# Patient Record
Sex: FEMALE | Race: WHITE | Employment: UNEMPLOYED | RURAL
[De-identification: names, ages, dates, MRNs, and addresses within clinical notes are randomized per-mention and may not be internally consistent; named-entity substitution may affect disease eponyms.]

---

## 2022-12-11 ENCOUNTER — HOSPITAL ENCOUNTER (EMERGENCY)
Facility: HOSPITAL | Age: 62
Discharge: HOME | End: 2022-12-11
Attending: EMERGENCY MEDICINE
Payer: MEDICAID

## 2022-12-11 ENCOUNTER — APPOINTMENT (OUTPATIENT)
Dept: GENERAL RADIOLOGY | Facility: HOSPITAL | Age: 62
End: 2022-12-11
Payer: MEDICAID

## 2022-12-11 VITALS
HEIGHT: 63 IN | OXYGEN SATURATION: 94 % | RESPIRATION RATE: 28 BRPM | TEMPERATURE: 99 F | BODY MASS INDEX: 25.16 KG/M2 | DIASTOLIC BLOOD PRESSURE: 107 MMHG | WEIGHT: 142 LBS | SYSTOLIC BLOOD PRESSURE: 144 MMHG | HEART RATE: 132 BPM

## 2022-12-11 DIAGNOSIS — J10.1 INFLUENZA A: Primary | ICD-10-CM

## 2022-12-11 LAB
A/G RATIO: 1.3 (ref 0.8–2)
ALBUMIN SERPL-MCNC: 4 G/DL (ref 3.4–4.8)
ALP BLD-CCNC: 104 U/L (ref 25–100)
ALT SERPL-CCNC: 14 U/L (ref 4–36)
ANION GAP SERPL CALCULATED.3IONS-SCNC: 11 MMOL/L (ref 3–16)
AST SERPL-CCNC: 24 U/L (ref 8–33)
BASOPHILS ABSOLUTE: 0 K/UL (ref 0–0.1)
BASOPHILS RELATIVE PERCENT: 0.3 %
BILIRUB SERPL-MCNC: 0.4 MG/DL (ref 0.3–1.2)
BUN BLDV-MCNC: 7 MG/DL (ref 6–20)
CALCIUM SERPL-MCNC: 9.3 MG/DL (ref 8.5–10.5)
CHLORIDE BLD-SCNC: 93 MMOL/L (ref 98–107)
CO2: 29 MMOL/L (ref 20–30)
CREAT SERPL-MCNC: 0.7 MG/DL (ref 0.4–1.2)
EOSINOPHILS ABSOLUTE: 0.1 K/UL (ref 0–0.4)
EOSINOPHILS RELATIVE PERCENT: 2 %
GFR SERPL CREATININE-BSD FRML MDRD: >60 ML/MIN/{1.73_M2}
GLOBULIN: 3 G/DL
GLUCOSE BLD-MCNC: 112 MG/DL (ref 74–106)
HCT VFR BLD CALC: 44.9 % (ref 37–47)
HEMOGLOBIN: 15.1 G/DL (ref 11.5–16.5)
IMMATURE GRANULOCYTES #: 0 K/UL
IMMATURE GRANULOCYTES %: 0.4 % (ref 0–5)
LACTIC ACID: 1.8 MMOL/L (ref 0.4–2)
LYMPHOCYTES ABSOLUTE: 1.6 K/UL (ref 1.5–4)
LYMPHOCYTES RELATIVE PERCENT: 23 %
MAGNESIUM: 1.7 MG/DL (ref 1.7–2.4)
MCH RBC QN AUTO: 30.2 PG (ref 27–32)
MCHC RBC AUTO-ENTMCNC: 33.6 G/DL (ref 31–35)
MCV RBC AUTO: 89.8 FL (ref 80–100)
MONOCYTES ABSOLUTE: 1.2 K/UL (ref 0.2–0.8)
MONOCYTES RELATIVE PERCENT: 17.1 %
NEUTROPHILS ABSOLUTE: 4 K/UL (ref 2–7.5)
NEUTROPHILS RELATIVE PERCENT: 57.2 %
PDW BLD-RTO: 14.2 % (ref 11–16)
PLATELET # BLD: 244 K/UL (ref 150–400)
PMV BLD AUTO: 9.9 FL (ref 6–10)
POTASSIUM SERPL-SCNC: 3.3 MMOL/L (ref 3.4–5.1)
RAPID INFLUENZA  B AGN: NEGATIVE
RAPID INFLUENZA A AGN: POSITIVE
RBC # BLD: 5 M/UL (ref 3.8–5.8)
SARS-COV-2, NAAT: NOT DETECTED
SODIUM BLD-SCNC: 133 MMOL/L (ref 136–145)
TOTAL PROTEIN: 7 G/DL (ref 6.4–8.3)
WBC # BLD: 6.9 K/UL (ref 4–11)

## 2022-12-11 PROCEDURE — 2580000003 HC RX 258: Performed by: EMERGENCY MEDICINE

## 2022-12-11 PROCEDURE — 87804 INFLUENZA ASSAY W/OPTIC: CPT

## 2022-12-11 PROCEDURE — 93005 ELECTROCARDIOGRAM TRACING: CPT

## 2022-12-11 PROCEDURE — 87635 SARS-COV-2 COVID-19 AMP PRB: CPT

## 2022-12-11 PROCEDURE — 80053 COMPREHEN METABOLIC PANEL: CPT

## 2022-12-11 PROCEDURE — 99285 EMERGENCY DEPT VISIT HI MDM: CPT

## 2022-12-11 PROCEDURE — 6370000000 HC RX 637 (ALT 250 FOR IP): Performed by: EMERGENCY MEDICINE

## 2022-12-11 PROCEDURE — 83735 ASSAY OF MAGNESIUM: CPT

## 2022-12-11 PROCEDURE — 83605 ASSAY OF LACTIC ACID: CPT

## 2022-12-11 PROCEDURE — 85025 COMPLETE CBC W/AUTO DIFF WBC: CPT

## 2022-12-11 PROCEDURE — 71045 X-RAY EXAM CHEST 1 VIEW: CPT

## 2022-12-11 PROCEDURE — 94640 AIRWAY INHALATION TREATMENT: CPT

## 2022-12-11 PROCEDURE — 36415 COLL VENOUS BLD VENIPUNCTURE: CPT

## 2022-12-11 RX ORDER — LISINOPRIL 40 MG/1
40 TABLET ORAL DAILY
COMMUNITY

## 2022-12-11 RX ORDER — PAROXETINE 10 MG/1
10 TABLET, FILM COATED ORAL EVERY MORNING
COMMUNITY

## 2022-12-11 RX ORDER — ASPIRIN 81 MG/1
81 TABLET ORAL DAILY
COMMUNITY

## 2022-12-11 RX ORDER — IPRATROPIUM BROMIDE AND ALBUTEROL SULFATE 2.5; .5 MG/3ML; MG/3ML
1 SOLUTION RESPIRATORY (INHALATION)
Status: COMPLETED | OUTPATIENT
Start: 2022-12-11 | End: 2022-12-11

## 2022-12-11 RX ORDER — GABAPENTIN 300 MG/1
300 CAPSULE ORAL 4 TIMES DAILY
COMMUNITY

## 2022-12-11 RX ORDER — FLUTICASONE PROPIONATE AND SALMETEROL 100; 50 UG/1; UG/1
1 POWDER RESPIRATORY (INHALATION) EVERY 12 HOURS
COMMUNITY

## 2022-12-11 RX ORDER — CETIRIZINE HYDROCHLORIDE 10 MG/1
10 TABLET ORAL DAILY
COMMUNITY

## 2022-12-11 RX ORDER — FLUTICASONE PROPIONATE 50 MCG
1 SPRAY, SUSPENSION (ML) NASAL DAILY
COMMUNITY

## 2022-12-11 RX ORDER — POTASSIUM CHLORIDE 20 MEQ/1
40 TABLET, EXTENDED RELEASE ORAL ONCE
Status: COMPLETED | OUTPATIENT
Start: 2022-12-11 | End: 2022-12-11

## 2022-12-11 RX ORDER — SODIUM CHLORIDE, SODIUM LACTATE, POTASSIUM CHLORIDE, AND CALCIUM CHLORIDE .6; .31; .03; .02 G/100ML; G/100ML; G/100ML; G/100ML
1000 INJECTION, SOLUTION INTRAVENOUS ONCE
Status: COMPLETED | OUTPATIENT
Start: 2022-12-11 | End: 2022-12-11

## 2022-12-11 RX ORDER — OSELTAMIVIR PHOSPHATE 75 MG/1
75 CAPSULE ORAL ONCE
Status: COMPLETED | OUTPATIENT
Start: 2022-12-11 | End: 2022-12-11

## 2022-12-11 RX ORDER — ALBUTEROL SULFATE 90 UG/1
2 AEROSOL, METERED RESPIRATORY (INHALATION) EVERY 6 HOURS PRN
COMMUNITY
End: 2022-12-11 | Stop reason: ALTCHOICE

## 2022-12-11 RX ORDER — IPRATROPIUM BROMIDE AND ALBUTEROL SULFATE 2.5; .5 MG/3ML; MG/3ML
1 SOLUTION RESPIRATORY (INHALATION) ONCE
Status: COMPLETED | OUTPATIENT
Start: 2022-12-11 | End: 2022-12-11

## 2022-12-11 RX ORDER — CHLORTHALIDONE 25 MG/1
25 TABLET ORAL DAILY
COMMUNITY

## 2022-12-11 RX ORDER — LANSOPRAZOLE 30 MG/1
30 TABLET, ORALLY DISINTEGRATING, DELAYED RELEASE ORAL DAILY
COMMUNITY

## 2022-12-11 RX ORDER — OSELTAMIVIR PHOSPHATE 75 MG/1
75 CAPSULE ORAL 2 TIMES DAILY
Qty: 10 CAPSULE | Refills: 0 | Status: SHIPPED | OUTPATIENT
Start: 2022-12-11 | End: 2022-12-16

## 2022-12-11 RX ORDER — ARIPIPRAZOLE 2 MG/1
2 TABLET ORAL DAILY
COMMUNITY

## 2022-12-11 RX ORDER — METOPROLOL SUCCINATE 50 MG/1
50 TABLET, EXTENDED RELEASE ORAL DAILY
COMMUNITY

## 2022-12-11 RX ORDER — LEVOTHYROXINE SODIUM 0.05 MG/1
50 TABLET ORAL DAILY
COMMUNITY

## 2022-12-11 RX ADMIN — IPRATROPIUM BROMIDE AND ALBUTEROL SULFATE 1 AMPULE: 2.5; .5 SOLUTION RESPIRATORY (INHALATION) at 17:27

## 2022-12-11 RX ADMIN — SODIUM CHLORIDE, POTASSIUM CHLORIDE, SODIUM LACTATE AND CALCIUM CHLORIDE 1000 ML: 600; 310; 30; 20 INJECTION, SOLUTION INTRAVENOUS at 17:10

## 2022-12-11 RX ADMIN — POTASSIUM CHLORIDE 40 MEQ: 1500 TABLET, EXTENDED RELEASE ORAL at 17:53

## 2022-12-11 RX ADMIN — OSELTAMIVIR PHOSPHATE 75 MG: 75 CAPSULE ORAL at 17:53

## 2022-12-11 RX ADMIN — IPRATROPIUM BROMIDE AND ALBUTEROL SULFATE 1 AMPULE: 2.5; .5 SOLUTION RESPIRATORY (INHALATION) at 18:39

## 2022-12-11 ASSESSMENT — PAIN DESCRIPTION - DESCRIPTORS
DESCRIPTORS: ACHING
DESCRIPTORS: ACHING

## 2022-12-11 ASSESSMENT — PAIN DESCRIPTION - LOCATION
LOCATION: LEG
LOCATION: GENERALIZED
LOCATION: LEG

## 2022-12-11 ASSESSMENT — PAIN DESCRIPTION - PAIN TYPE
TYPE: CHRONIC PAIN
TYPE: ACUTE PAIN;CHRONIC PAIN
TYPE: CHRONIC PAIN

## 2022-12-11 ASSESSMENT — PAIN SCALES - GENERAL
PAINLEVEL_OUTOF10: 9
PAINLEVEL_OUTOF10: 4
PAINLEVEL_OUTOF10: 4

## 2022-12-11 ASSESSMENT — PAIN DESCRIPTION - ORIENTATION
ORIENTATION: LEFT;RIGHT
ORIENTATION: RIGHT;LEFT

## 2022-12-11 ASSESSMENT — PAIN - FUNCTIONAL ASSESSMENT
PAIN_FUNCTIONAL_ASSESSMENT: 0-10
PAIN_FUNCTIONAL_ASSESSMENT: 0-10

## 2022-12-11 ASSESSMENT — LIFESTYLE VARIABLES: HOW OFTEN DO YOU HAVE A DRINK CONTAINING ALCOHOL: NEVER

## 2022-12-11 NOTE — ED NOTES
Per RN Reynaldo Nunez, went to pull the IV and repeat vitals for this patient. Patient states that she, \" can not breathe or catch her breath\". States that her chest still burns as well. Patient at this time has a Heart Rate of  149 and RR of 40. Nurse Reynaldo Nunez was in the room at this time and suggested that I wait to pull the IV so she can report these vitals to the DR. Jermaine Clay  12/11/22 1375

## 2022-12-11 NOTE — FLOWSHEET NOTE
12/11/22 1830   Vital Signs   Heart Rate (!) 128   Resp 26   BP (!) 157/102   MAP (Calculated) 120   MAP (mmHg) 117   Oxygen Therapy   SpO2 94 %   Pulse via Oximetry 127 beats per minute    Pt is up for discharge,  vs elevated and pt complains of shortness of breath, spoke with MD r/t tachycardia, hypertension and complaints of shortness of breath.

## 2022-12-11 NOTE — ED NOTES
I spoke with the patient about her wish to go home or stay in the hospital, since she stated she was still having trouble breathing. She stated she wanted to go home. Her daughter is going to help her find her nebulizer machine and she will take her breathing treatments at home. I gave her a second Duoneb. She was still wheezing throughout and had a dry, strong cough at this time. She stated she felt a little better and thought she could manage this at home. She agreed to come back if she didn't get any better. Kojo Bear RN and Dr. Zahida Brady aware.  DW, RRT

## 2022-12-11 NOTE — ED NOTES
I did speak with dr Shyanne Callahan about pt discharge, bp elevated, hr elevated. Per md ok to discharge to home. She advised that she has spoken with pt, who declined admission. She advised pt to return if she is feeling worse or has any new complaints or concerns.      Reg Allen RN  12/11/22 [address] Juan Cortez RN  12/11/22 8821

## 2022-12-11 NOTE — ED PROVIDER NOTES
Sena Callahan [address] ENCOUNTER      Pt Name: Luiza Perez  MRN: [id number]  YOB: 1960  Date of evaluation: 12/11/2022  Provider: Juancho Ham MD    CHIEF COMPLAINT       Chief Complaint   Patient presents with    Shortness of Breath    Nasal Congestion         HISTORY OF PRESENT ILLNESS  (Location/Symptom, Timing/Onset, Context/Setting, Quality, Duration, Modifying Factors, Severity.)   Luiza Perez is a 63 y.o. female who presents to the emergency department complaining of shortness of breath chest congestion hoarseness for the last 3 days she did take Tylenol about 2-1/2 hours ago she has not had a fever she has had head congestion but no sore throat she has had a dry cough no chest pain or abdominal pain no nausea vomiting diarrhea she has not had a COVID-19 or flu vaccine. She is not sure she has been exposed to anyone that been sick recently. She does have a history of COPD and used her inhaler today but has not been able to find her nebulizer machine because she is in the process of moving. She states that she is never used steroids before for her COPD and she is not on home O2. Nursing notes were reviewed. REVIEW OFSYSTEMS    (2-9 systems for level 4, 10 or more for level 5)   ROS:  General:  No fevers, no chills, no weakness  Cardiovascular:  No chest pain, no palpitations  Respiratory:  + shortness of breath, +cough, +wheezing  Gastrointestinal:  No pain, no nausea, no vomiting, no diarrhea  Musculoskeletal:  No muscle pain, no joint pain  Skin:  No rash, no easy bruising  Neurologic:  No speech problems, no headache, no extremity weakness  Psychiatric:  No anxiety  Genitourinary:  No dysuria, no hematuria    Except as noted above the remainder of the review of systems was reviewed and negative.        PAST MEDICAL HISTORY     Past Medical History:   Diagnosis Date    Anxiety     Fibromyalgia     Hypertension          SURGICAL HISTORY       Past Surgical History:   Procedure Laterality Date    CHOLECYSTECTOMY           CURRENT MEDICATIONS       Previous Medications    ARIPIPRAZOLE (ABILIFY) 2 MG TABLET    Take 2 mg by mouth daily    ASPIRIN 81 MG EC TABLET    Take 81 mg by mouth daily    CETIRIZINE (ZYRTEC) 10 MG TABLET    Take 10 mg by mouth daily    CHLORTHALIDONE (HYGROTON) 25 MG TABLET    Take 25 mg by mouth daily    FLUTICASONE (FLONASE) 50 MCG/ACT NASAL SPRAY    1 spray by Each Nostril route daily    FLUTICASONE-SALMETEROL (ADVAIR) 100-50 MCG/ACT AEPB DISKUS INHALER    Inhale 1 puff into the lungs every 12 hours    GABAPENTIN (NEURONTIN) 300 MG CAPSULE    Take 300 mg by mouth 4 times daily. LANSOPRAZOLE (PREVACID SOLUTAB) 30 MG DISINTEGRATING TABLET    Take 30 mg by mouth daily    LEVOTHYROXINE (SYNTHROID) 50 MCG TABLET    Take 50 mcg by mouth Daily    LISINOPRIL (PRINIVIL;ZESTRIL) 40 MG TABLET    Take 40 mg by mouth daily    METOPROLOL SUCCINATE (TOPROL XL) 50 MG EXTENDED RELEASE TABLET    Take 50 mg by mouth daily    NONFORMULARY    Anxiety    PAROXETINE (PAXIL) 10 MG TABLET    Take 10 mg by mouth every morning       ALLERGIES     Patient has no known allergies. FAMILY HISTORY     History reviewed. No pertinent family history.        SOCIAL HISTORY       Social History     Socioeconomic History    Marital status: Single     Spouse name: None    Number of children: None    Years of education: None    Highest education level: None   Tobacco Use    Smoking status: Every Day     Packs/day: 1.00     Types: Cigarettes    Smokeless tobacco: Never   Substance and Sexual Activity    Drug use: Never         PHYSICAL EXAM    (up to 7 for level 4, 8 or more for level 5)     ED Triage Vitals   BP Temp Temp src Pulse Resp SpO2 Height Weight   -- -- -- -- -- -- -- --       Physical Exam  General :Patient is awake, alert, oriented, in no acute distress, nontoxic appearing  HEENT: Pupils are equally round and reactive to light, EOMI, conjunctivae clear. Neck: Neck is supple, full range of motion, trachea midline no adenopathy  Cardiac: Heart tachycardic rate, rhythm, no murmurs, rubs, or gallops  Lungs: Lungs are bilateral expiratory wheezing Chest wall: There is no tenderness to palpation over the chest wall or over ribs  Abdomen: Abdomen is soft, nontender, nondistended. There is no firm or pulsatile masses, no rebound rigidity or guarding. Musculoskeletal: 5 out of 5 strength in all 4 extremities. No focal muscle deficits are appreciated  Neuro: Motor intact, sensory intact, level of consciousness is normal,Dermatology: Skin is warm and dry  Psych: Mentation is grossly normal, cognition is grossly normal. Affect is appropriate. DIAGNOSTIC RESULTS     EKG: All EKG's are interpreted by the Emergency Department Physician who either signs or Co-signs this chart in the [address] a cardiologist.    The EKG interpreted by me shows sinus tachycardia rate of 138 right bundle branch block left posterior fascicular block. RADIOLOGY:   Non-plain film images such as CT, Ultrasound and MRI are read by the radiologist. Plain radiographic images are visualized and preliminarily interpreted by the emergency physician with the below findings:             radiologist's Report Reviewed:  XR CHEST PORTABLE   Final Result      1.  No acute process or consolidation                        ED BEDSIDE ULTRASOUND:   Performed by ED Physician - none    LABS:    I have reviewed and interpreted all of the currently available lab results from this visit (ifapplicable):  Results for orders placed or performed during the hospital encounter of 12/11/22   COVID-19, Rapid    Specimen: Nasopharyngeal Swab   Result Value Ref Range    SARS-CoV-2, NAAT Not Detected Not Detected   Rapid Influenza A/B Antigens    Specimen: Nasopharyngeal   Result Value Ref Range    Rapid Influenza A Ag POSITIVE (A) Negative    Rapid Influenza B Ag Negative Negative   CBC with Auto Differential   Result Value Ref Range    WBC 6.9 4.0 - 11.0 K/uL    RBC 5.00 3.80 - 5.80 M/uL    Hemoglobin 15.1 11.5 - 16.5 g/dL    Hematocrit 44.9 37.0 - 47.0 %    MCV 89.8 80.0 - 100.0 fL    MCH 30.2 27.0 - 32.0 pg    MCHC 33.6 31.0 - 35.0 g/dL    RDW 14.2 11.0 - 16.0 %    Platelets 869 489 - 672 K/uL    MPV 9.9 6.0 - 10.0 fL    Neutrophils % 57.2 %    Immature Granulocytes % 0.4 0.0 - 5.0 %    Lymphocytes % 23.0 %    Monocytes % 17.1 %    Eosinophils % 2.0 %    Basophils % 0.3 %    Neutrophils Absolute 4.0 2.0 - 7.5 K/uL    Immature Granulocytes # 0.0 K/uL    Lymphocytes Absolute 1.6 1.5 - 4.0 K/uL    Monocytes Absolute 1.2 (H) 0.2 - 0.8 K/uL    Eosinophils Absolute 0.1 0.0 - 0.4 K/uL    Basophils Absolute 0.0 0.0 - 0.1 K/uL   Comprehensive Metabolic Panel   Result Value Ref Range    Sodium 133 (L) 136 - 145 mmol/L    Potassium 3.3 (L) 3.4 - 5.1 mmol/L    Chloride 93 (L) 98 - 107 mmol/L    CO2 29 20 - 30 mmol/L    Anion Gap 11 3 - 16    Glucose 112 (H) 74 - 106 mg/dL    BUN 7 6 - 20 mg/dL    Creatinine 0.7 0.4 - 1.2 mg/dL    Est, Glom Filt Rate >60 >59    Calcium 9.3 8.5 - 10.5 mg/dL    Total Protein 7.0 6.4 - 8.3 g/dL    Albumin 4.0 3.4 - 4.8 g/dL    Albumin/Globulin Ratio 1.3 0.8 - 2.0    Total Bilirubin 0.4 0.3 - 1.2 mg/dL    Alkaline Phosphatase 104 (H) 25 - 100 U/L    ALT 14 4 - 36 U/L    AST 24 8 - 33 U/L    Globulin 3.0 Not Established g/dL   Magnesium   Result Value Ref Range    Magnesium 1.7 1.7 - 2.4 mg/dL   Lactic Acid   Result Value Ref Range    Lactic Acid 1.8 0.4 - 2.0 mmol/L        All other labs were within normal range or not returned as of this dictation.     EMERGENCY DEPARTMENT COURSE and DIFFERENTIAL DIAGNOSIS/MDM:   Vitals:    Vitals:    12/11/22 1725 12/11/22 1728 12/11/22 1735 12/11/22 1755   BP: (!) 142/106      Pulse: (!) 124  (!) 128 (!) 122   Resp: 26  26 24   Temp:       TempSrc:       SpO2: 96% 97% 100% 98%   Weight:       Height:           MEDICATIONS ADMINISTERED IN ED:  Medications   lactated ringers bolus (1,000 mLs IntraVENous New Bag 12/11/22 1710)   ipratropium-albuterol (DUONEB) nebulizer solution 1 ampule (1 ampule Inhalation Given 12/11/22 1727)   oseltamivir (TAMIFLU) capsule 75 mg (75 mg Oral Given 12/11/22 1753)   potassium chloride (KLOR-CON M) extended release tablet 40 mEq (40 mEq Oral Given 12/11/22 1753)       Patient is positive for flu A her CBC is normal potassium is low at 3.3 and has been supplemented with 40 p.o. potassium chest x-ray is clear patient is still tachycardic but her O2 sats are 97% and she feels like she is breathing much better since the neb treatment. Therefore as I discussed with her we will go ahead and discharge her to home and going to start her on Tamiflu given her high risk her underlying COPD her first dose was given here. Also she is out of her nebulizer medicine so I will go ahead and call her in some medicine for her nebulizer for use for the next several days and have her follow-up with family doctor there in the next for 5 days she was also instructed that should she get worse instead of better she should come back. 18:56 patient is still tachycardic low 130s sats are good at 90 to 93% I have talked to her again to offer possibility for admission given the fact that she is still fairly tachycardic and told the tech that she was having trouble breathing. We have given her another neb treatment she states that she feels much better after that and she does not want to be admitted she will go home and use her nebulizer at home and follow-up with her doctor. I have also reinforced to her that should she get worse or even not be better she needs to come back to the emergency room and she has agreed. The patient will follow-up with their PCP in 1-2 days for reevaluation. If the patient or family members have anyfurther concerns or any worsening symptoms they will return to the ED for reevaluation.     Is this patient to be included in the SEP-1 Core Measure due to severe sepsis or septic shock? No   Exclusion criteria - the patient is NOT to be included for SEP-1 Core Measure due to:  Viral etiology found or highly suspected (including COVID-19) without concomitant bacterial infection          CONSULTS:  None    PROCEDURES:  Procedures    CRITICAL CARE TIME    Total Critical Care time was 0 minutes, excluding separately reportable procedures. There was a high probability of clinically significant/life threatening deterioration in the patient's condition which required my urgent intervention. FINAL IMPRESSION      1. Influenza A New Problem         DISPOSITION/PLAN   DISPOSITION Decision To Discharge 12/11/2022 05:59:28 PM  Improved discharge to home    PATIENT REFERRED TO:      Schedule an appointment as soon as possible for a visit in 5 days  Follow up within 5 days, Return to ED sooner if symptoms worsen    DISCHARGE MEDICATIONS:  New Prescriptions    ALBUTEROL (PROVENTIL) (5 MG/ML) 0.5% NEBULIZER SOLUTION    Take 1 mL by nebulization 4 times daily as needed for Wheezing    OSELTAMIVIR (TAMIFLU) 75 MG CAPSULE    Take 1 capsule by mouth 2 times daily for 5 days       Comment: Please note this report has been produced using speech recognition software and may contain errorsrelated to that system including errors in grammar, punctuation, and spelling, as well as words and phrases that may be inappropriate. If there are any questions or concerns please feel free to contact the dictating providerfor clarification.     Geneva Cano MD  Attending Emergency Physician               Geneva Cano MD  12/11/22 Arvin Fajardo MD  12/11/22 0641

## 2022-12-12 ENCOUNTER — HOSPITAL ENCOUNTER (EMERGENCY)
Facility: HOSPITAL | Age: 62
Discharge: HOME | End: 2022-12-12
Attending: FAMILY MEDICINE
Payer: MEDICAID

## 2022-12-12 ENCOUNTER — APPOINTMENT (OUTPATIENT)
Dept: GENERAL RADIOLOGY | Facility: HOSPITAL | Age: 62
End: 2022-12-12
Payer: MEDICAID

## 2022-12-12 VITALS
OXYGEN SATURATION: 94 % | HEART RATE: 115 BPM | SYSTOLIC BLOOD PRESSURE: 166 MMHG | DIASTOLIC BLOOD PRESSURE: 106 MMHG | WEIGHT: 142 LBS | TEMPERATURE: 98.1 F | RESPIRATION RATE: 20 BRPM | BODY MASS INDEX: 25.16 KG/M2 | HEIGHT: 63 IN

## 2022-12-12 DIAGNOSIS — R06.03 ACUTE RESPIRATORY DISTRESS: ICD-10-CM

## 2022-12-12 DIAGNOSIS — J10.1 INFLUENZA A: Primary | ICD-10-CM

## 2022-12-12 LAB
A/G RATIO: 1.4 (ref 0.8–2)
ALBUMIN SERPL-MCNC: 4.2 G/DL (ref 3.4–4.8)
ALP BLD-CCNC: 103 U/L (ref 25–100)
ALT SERPL-CCNC: 16 U/L (ref 4–36)
ANION GAP SERPL CALCULATED.3IONS-SCNC: 12 MMOL/L (ref 3–16)
AST SERPL-CCNC: 27 U/L (ref 8–33)
BASOPHILS ABSOLUTE: 0 K/UL (ref 0–0.1)
BASOPHILS RELATIVE PERCENT: 0.4 %
BILIRUB SERPL-MCNC: 0.3 MG/DL (ref 0.3–1.2)
BUN BLDV-MCNC: 4 MG/DL (ref 6–20)
CALCIUM SERPL-MCNC: 9.2 MG/DL (ref 8.5–10.5)
CHLORIDE BLD-SCNC: 93 MMOL/L (ref 98–107)
CO2: 28 MMOL/L (ref 20–30)
CREAT SERPL-MCNC: 0.6 MG/DL (ref 0.4–1.2)
EOSINOPHILS ABSOLUTE: 0 K/UL (ref 0–0.4)
EOSINOPHILS RELATIVE PERCENT: 0.2 %
GFR SERPL CREATININE-BSD FRML MDRD: >60 ML/MIN/{1.73_M2}
GLOBULIN: 3 G/DL
GLUCOSE BLD-MCNC: 87 MG/DL (ref 74–106)
HCT VFR BLD CALC: 42.9 % (ref 37–47)
HEMOGLOBIN: 14.6 G/DL (ref 11.5–16.5)
IMMATURE GRANULOCYTES #: 0 K/UL
IMMATURE GRANULOCYTES %: 0.8 % (ref 0–5)
LACTIC ACID: 1.8 MMOL/L (ref 0.4–2)
LYMPHOCYTES ABSOLUTE: 1.9 K/UL (ref 1.5–4)
LYMPHOCYTES RELATIVE PERCENT: 38.4 %
MCH RBC QN AUTO: 30 PG (ref 27–32)
MCHC RBC AUTO-ENTMCNC: 34 G/DL (ref 31–35)
MCV RBC AUTO: 88.3 FL (ref 80–100)
MONOCYTES ABSOLUTE: 0.7 K/UL (ref 0.2–0.8)
MONOCYTES RELATIVE PERCENT: 14.5 %
NEUTROPHILS ABSOLUTE: 2.2 K/UL (ref 2–7.5)
NEUTROPHILS RELATIVE PERCENT: 45.7 %
PDW BLD-RTO: 14.1 % (ref 11–16)
PLATELET # BLD: 250 K/UL (ref 150–400)
PMV BLD AUTO: 9.9 FL (ref 6–10)
POTASSIUM SERPL-SCNC: 3.2 MMOL/L (ref 3.4–5.1)
RBC # BLD: 4.86 M/UL (ref 3.8–5.8)
SODIUM BLD-SCNC: 133 MMOL/L (ref 136–145)
TOTAL PROTEIN: 7.2 G/DL (ref 6.4–8.3)
TROPONIN: <0.3 NG/ML
WBC # BLD: 4.9 K/UL (ref 4–11)

## 2022-12-12 PROCEDURE — 6370000000 HC RX 637 (ALT 250 FOR IP): Performed by: FAMILY MEDICINE

## 2022-12-12 PROCEDURE — 80053 COMPREHEN METABOLIC PANEL: CPT

## 2022-12-12 PROCEDURE — 94640 AIRWAY INHALATION TREATMENT: CPT

## 2022-12-12 PROCEDURE — 6360000002 HC RX W HCPCS: Performed by: FAMILY MEDICINE

## 2022-12-12 PROCEDURE — 83605 ASSAY OF LACTIC ACID: CPT

## 2022-12-12 PROCEDURE — 96374 THER/PROPH/DIAG INJ IV PUSH: CPT

## 2022-12-12 PROCEDURE — 87040 BLOOD CULTURE FOR BACTERIA: CPT

## 2022-12-12 PROCEDURE — 99284 EMERGENCY DEPT VISIT MOD MDM: CPT

## 2022-12-12 PROCEDURE — 98960 EDU&TRN PT SELF-MGMT NQHP 1: CPT

## 2022-12-12 PROCEDURE — 85025 COMPLETE CBC W/AUTO DIFF WBC: CPT

## 2022-12-12 PROCEDURE — 71045 X-RAY EXAM CHEST 1 VIEW: CPT

## 2022-12-12 PROCEDURE — 84484 ASSAY OF TROPONIN QUANT: CPT

## 2022-12-12 PROCEDURE — 36415 COLL VENOUS BLD VENIPUNCTURE: CPT

## 2022-12-12 RX ORDER — IPRATROPIUM BROMIDE AND ALBUTEROL SULFATE 2.5; .5 MG/3ML; MG/3ML
1 SOLUTION RESPIRATORY (INHALATION) ONCE
Status: COMPLETED | OUTPATIENT
Start: 2022-12-12 | End: 2022-12-12

## 2022-12-12 RX ORDER — METHYLPREDNISOLONE SODIUM SUCCINATE 125 MG/2ML
125 INJECTION, POWDER, LYOPHILIZED, FOR SOLUTION INTRAMUSCULAR; INTRAVENOUS ONCE
Status: COMPLETED | OUTPATIENT
Start: 2022-12-12 | End: 2022-12-12

## 2022-12-12 RX ORDER — HYDROCODONE POLISTIREX AND CHLORPHENIRAMINE POLISTIREX 10; 8 MG/5ML; MG/5ML
5 SUSPENSION, EXTENDED RELEASE ORAL ONCE
Status: COMPLETED | OUTPATIENT
Start: 2022-12-12 | End: 2022-12-12

## 2022-12-12 RX ORDER — PREDNISONE 20 MG/1
TABLET ORAL
Qty: 12 TABLET | Refills: 0 | Status: SHIPPED | OUTPATIENT
Start: 2022-12-12

## 2022-12-12 RX ORDER — OSELTAMIVIR PHOSPHATE 75 MG/1
75 CAPSULE ORAL ONCE
Status: COMPLETED | OUTPATIENT
Start: 2022-12-12 | End: 2022-12-12

## 2022-12-12 RX ORDER — HYDROCODONE POLISTIREX AND CHLORPHENIRAMINE POLISTIREX 10; 8 MG/5ML; MG/5ML
5 SUSPENSION, EXTENDED RELEASE ORAL EVERY 12 HOURS PRN
Qty: 60 ML | Refills: 0 | Status: SHIPPED | OUTPATIENT
Start: 2022-12-12 | End: 2022-12-18

## 2022-12-12 RX ADMIN — IPRATROPIUM BROMIDE AND ALBUTEROL SULFATE 1 AMPULE: 2.5; .5 SOLUTION RESPIRATORY (INHALATION) at 20:15

## 2022-12-12 RX ADMIN — OSELTAMIVIR PHOSPHATE 75 MG: 75 CAPSULE ORAL at 22:42

## 2022-12-12 RX ADMIN — METHYLPREDNISOLONE SODIUM SUCCINATE 125 MG: 125 INJECTION, POWDER, FOR SOLUTION INTRAMUSCULAR; INTRAVENOUS at 20:24

## 2022-12-12 RX ADMIN — Medication 5 ML: at 20:24

## 2022-12-12 NOTE — ED NOTES
Reviewed discharge plan with Kathie Darling. Encouraged her to f/u with a pcp and she understood. Gait steady on discharge. Reviewed discharge prescription for:     Current Discharge Medication List        START taking these medications    Details   oseltamivir (TAMIFLU) 75 MG capsule Take 1 capsule by mouth 2 times daily for 5 days  Qty: 10 capsule, Refills: 0      albuterol (PROVENTIL) (5 MG/ML) 0.5% nebulizer solution Take 1 mL by nebulization 4 times daily as needed for Wheezing  Qty: 120 each, Refills: 3             Maria C states understanding of how and when to take medications. Pt encouraged to return to er if she starts feeling worse. Pt stated agreement.     Electronically signed by Toney Najera RN on 12/11/2022 at 7:35 PM       Toney Najera RN  12/11/22 1936

## 2022-12-13 ASSESSMENT — ENCOUNTER SYMPTOMS
COUGH: 1
SHORTNESS OF BREATH: 1
WHEEZING: 1

## 2022-12-13 NOTE — ED TRIAGE NOTES
Patient reports was in ED yesterday diagnosed with Flu, & offered admission to hospital by doctor Amara Tan, but patient refused. Patient states she was told to return to ED if she got worse. Patient reports became increasingly SOA about midday today & decided to come in for hospital admission tonight.

## 2022-12-13 NOTE — PROGRESS NOTES
Oxygen - none at home  MDI - Advair daily   Neb - Albuterol 3-4x daily  Pap therapy - patient does not wear PAP  Smoking hx - Current smoker.  1 pack daily for 30+ years

## 2022-12-13 NOTE — ED PROVIDER NOTES
[address]  eMERGENCY dEPARTMENT eNCOUnter      Pt Name: Jazzy Castanon  MRN: [id number]  [place] 1960  Date of evaluation: 12/12/2022  [facility]       Chief Complaint   Patient presents with    Shortness of Breath    Influenza         HISTORY OF PRESENT ILLNESS   (Location/Symptom, Timing/Onset, Context/Setting, Quality, Duration, Modifying Factors, Severity)  Note limiting factors. Jazzy Castanon is a 63 y.o. female who presents to the emergency department for trouble breathing and coughing. Pt said that she was diagnosed with influenza A yesterday, she was offered admission by declined. She was given Tamiflu and neb treatments. She has history of COPD, she normally smokes about 1 ppd. She has only been able to smoke about 7-8 cigarettes in 5 days. She has been having worsening cough to point of not being able to quit. Didn't pick her medication up till today. Lives with her daughter. No fever. No new symptoms. Having continued chest congestion. Dry cough. Not on any home oxygen. Some pain to ribs from coughing but no anterior chest pain. Nursing Notes were reviewed. REVIEW OF SYSTEMS    (2-9 systems for level 4, 10 or more forlevel 5)     Review of Systems   Constitutional:  Positive for activity change. HENT:  Positive for congestion. Respiratory:  Positive for cough, shortness of breath and wheezing. Psychiatric/Behavioral:  Positive for sleep disturbance. All other systems reviewed and are negative.         PAST MEDICAL HISTORY     Past Medical History:   Diagnosis Date    Anxiety     Fibromyalgia     Hypertension          SURGICAL HISTORY       Past Surgical History:   Procedure Laterality Date    CHOLECYSTECTOMY           CURRENT MEDICATIONS       Discharge Medication List as of 12/12/2022 10:30 PM        CONTINUE these medications which have NOT CHANGED    Details   NONFORMULARY AnxietyHistorical Med fluticasone-salmeterol (ADVAIR) 100-50 MCG/ACT AEPB diskus inhaler Inhale 1 puff into the lungs every 12 hoursHistorical Med      metoprolol succinate (TOPROL XL) 50 MG extended release tablet Take 50 mg by mouth dailyHistorical Med      gabapentin (NEURONTIN) 300 MG capsule Take 300 mg by mouth 4 times daily. Historical Med      aspirin 81 MG EC tablet Take 81 mg by mouth dailyHistorical Med      lansoprazole (PREVACID SOLUTAB) 30 MG disintegrating tablet Take 30 mg by mouth dailyHistorical Med      ARIPiprazole (ABILIFY) 2 MG tablet Take 2 mg by mouth dailyHistorical Med      chlorthalidone (HYGROTON) 25 MG tablet Take 25 mg by mouth dailyHistorical Med      fluticasone (FLONASE) 50 MCG/ACT nasal spray 1 spray by Each Nostril route dailyHistorical Med      cetirizine (ZYRTEC) 10 MG tablet Take 10 mg by mouth dailyHistorical Med      PARoxetine (PAXIL) 10 MG tablet Take 10 mg by mouth every morningHistorical Med      levothyroxine (SYNTHROID) 50 MCG tablet Take 50 mcg by mouth DailyHistorical Med      lisinopril (PRINIVIL;ZESTRIL) 40 MG tablet Take 40 mg by mouth dailyHistorical Med      oseltamivir (TAMIFLU) 75 MG capsule Take 1 capsule by mouth 2 times daily for 5 days, Disp-10 capsule, R-0Normal      albuterol (PROVENTIL) (5 MG/ML) 0.5% nebulizer solution Take 1 mL by nebulization 4 times daily as needed for Wheezing, Disp-120 each, R-3Normal             ALLERGIES     Patient has no known allergies. FAMILY HISTORY     History reviewed. No pertinent family history.        SOCIAL HISTORY       Social History     Socioeconomic History    Marital status: Single     Spouse name: None    Number of children: None    Years of education: None    Highest education level: None   Tobacco Use    Smoking status: Every Day     Packs/day: 1.00     Types: Cigarettes    Smokeless tobacco: Never   Substance and Sexual Activity    Drug use: Never       SCREENINGS    [place] Coma Scale  Eye Opening: Spontaneous  Best Verbal Response: Oriented  Best Motor Response: Obeys commands  [place] Coma Scale Score: 15        PHYSICAL EXAM    (up to 7 for level 4, 8 or more for level 5)     ED Triage Vitals [12/12/22 1940]   BP Temp Temp Source Heart Rate Resp SpO2 Height Weight   (!) 172/117 98.1 °F (36.7 °C) Oral (!) 119 28 97 % 5' 3\" (1.6 m) 142 lb (64.4 kg)       Physical Exam  Vitals and nursing note reviewed. Constitutional:       General: She is in acute distress. Appearance: She is well-developed. Comments: Mild/mod respiratory distress, intermittent coughing in the room   HENT:      Head: Normocephalic. Mouth/Throat:      Mouth: Mucous membranes are moist.      Pharynx: Oropharynx is clear. Eyes:      Extraocular Movements: Extraocular movements intact. Pupils: Pupils are equal, round, and reactive to light. Cardiovascular:      Rate and Rhythm: Regular rhythm. Tachycardia present. Heart sounds: Normal heart sounds. Pulmonary:      Effort: Tachypnea and respiratory distress present. Comments: Tachypneic with RR 26-28; Expiratory wheezing throughout with coarse breath sounds  Musculoskeletal:         General: Normal range of motion. Cervical back: Normal range of motion and neck supple. Neurological:      Mental Status: She is alert and oriented to person, place, and time. Psychiatric:         Mood and Affect: Mood is anxious.          Behavior: Behavior normal.       DIAGNOSTIC RESULTS     EKG: All EKG's are interpreted by the Emergency Department Physician who either signs or Co-signs this chart in the absence of a cardiologist.    ; Normal  ms; Prolonged  ms; RBBB; No acute ST-T elev/dep; Normal axis    RADIOLOGY:   Non-plain film images such as CT, Ultrasound and MRI are read by the radiologist. Plain radiographic images are visualized andpreliminarily interpreted by the emergency physician with the below findings:    CXR - See Below    Interpretationper the Radiologist below, if available at the time of this note:    XR CHEST 1 VIEW   Final Result      No acute pulmonary pathology                        ED BEDSIDE ULTRASOUND:   Performed by ED Physician - none    LABS:  Labs Reviewed   COMPREHENSIVE METABOLIC PANEL - Abnormal; Notable for the following components:       Result Value    Sodium 133 (*)     Potassium 3.2 (*)     Chloride 93 (*)     BUN 4 (*)     Alkaline Phosphatase 103 (*)     All other components within normal limits   CULTURE, BLOOD 1   CBC WITH AUTO DIFFERENTIAL   LACTIC ACID   TROPONIN       All other labs were within normal range or not returned as of this dictation. EMERGENCY DEPARTMENT COURSE and DIFFERENTIAL DIAGNOSIS/MDM:   :    Vitals:    12/12/22 2045 12/12/22 2100 12/12/22 2115 12/12/22 2130   BP: (!) 149/106 (!) 166/107 (!) 153/99 (!) 166/106   Pulse: (!) 116 (!) 123 (!) 110 (!) 115   Resp: (!) 36 28 (!) 34 20   Temp:       TempSrc:       SpO2: 97% 95% 95% 94%   Weight:       Height:               CRITICAL CARE TIME   Total Critical Care time was 0 minutes, excluding separatelyreportable procedures. There was a high probability ofclinically significant/life threatening deterioration in the patient's condition which required my urgent intervention. CONSULTS:  None    PROCEDURES:  None    PROGRESS NOTES:    Pt with diagnosis of the Flu A. Here yesterday and almost admitted for sat dropping to low 90's and tachycardia. Pt declined. Pt came in with respiratory distress. Still smoking while sick. Wheezing. Placed IV, gave solumedrol IV, Tussinex 5 ml for cough, neb treatment for wheezing. Ordered repeat CXR to sse any change to lungs, ECG done for heart rate documentation, basic labs and troponin to make sure no ischemic events were occurring. Pt eventually stopped coughing, heart rate dropped down to 105, feeling much better, no further respiratory distress.  Had talked to the patient about admission since she was offered it yesterday but with oxygen still above 90 actually 95% in room, still with no CXR findings, no lab changes, could discharge with maximizing medication treatment, lives with daughter who can watch her. Pt would rather do that than be admitted. Discussed to return if symptoms worsen. Should have all meds needed for night and gave Tamiflu for her night dose. Is this patient to be included in the SEP-1 Core Measure due to severe sepsis or septic shock? No   Exclusion criteria - the patient is NOT to be included for SEP-1 Core Measure due to:  Viral etiology found or highly suspected (including COVID-19) without concomitant bacterial infection     FINAL IMPRESSION      1. Influenza A New Problem   2. Acute respiratory distress New Problem         DISPOSITION/PLAN   DISPOSITION Decision To Discharge 12/12/2022 10:25:25 PM      PATIENT REFERRED TO:    Follow up with primary care in next couple days as needed. Medications sent to pharmacy. Continue neb treatments Q 4 hours along with lung meds. Please do not smoke while having trouble breathing with the flu. DISCHARGE MEDICATIONS:  Discharge Medication List as of 12/12/2022 10:30 PM        START taking these medications    Details   predniSONE (DELTASONE) 20 MG tablet Take one tab TID for 2 days, one tab BID for 2 days, one tab daily for 2 days, then stop, Disp-12 tablet, R-0Normal      HYDROcodone-chlorpheniramine (TUSSIONEX PENNKINETIC ER) 10-8 MG/5ML SUER Take 5 mLs by mouth every 12 hours as needed (cough) for up to 6 days. , Disp-60 mL, R-0Normal             (Please note that portions of this note were completed with a voice recognition program.  Efforts were made to edit the dictations but occasionallywords are mis-transcribed.)    Trey Laura DO (electronically signed)  Attending Emergency Physician          Trey Laura DO  12/13/22 [id number]

## 2022-12-13 NOTE — ED NOTES
Discharge instructions provided to patient. Patient verbalizes understanding of d/c plan and follow up care. PIV removed. Patient given tamiflu (missed home dose); see MAR. Patient ambulatory off unit to POV at this time with no further needs noted.       Cliff Stanton RN  12/12/22 [id number]

## 2022-12-17 LAB — BLOOD CULTURE, ROUTINE: NORMAL
